# Patient Record
Sex: FEMALE | Race: WHITE
[De-identification: names, ages, dates, MRNs, and addresses within clinical notes are randomized per-mention and may not be internally consistent; named-entity substitution may affect disease eponyms.]

---

## 2018-01-06 ENCOUNTER — HOSPITAL ENCOUNTER (EMERGENCY)
Dept: HOSPITAL 8 - ED | Age: 48
Discharge: HOME | End: 2018-01-06
Payer: COMMERCIAL

## 2018-01-06 VITALS — WEIGHT: 282.41 LBS | HEIGHT: 70 IN | BODY MASS INDEX: 40.43 KG/M2

## 2018-01-06 VITALS — SYSTOLIC BLOOD PRESSURE: 123 MMHG | DIASTOLIC BLOOD PRESSURE: 65 MMHG

## 2018-01-06 DIAGNOSIS — J45.909: ICD-10-CM

## 2018-01-06 DIAGNOSIS — Z90.49: ICD-10-CM

## 2018-01-06 DIAGNOSIS — B34.9: Primary | ICD-10-CM

## 2018-01-06 LAB
ALBUMIN SERPL-MCNC: 3.3 G/DL (ref 3.4–5)
ANION GAP SERPL CALC-SCNC: 8 MMOL/L (ref 5–15)
BASOPHILS # BLD AUTO: 0.03 X10^3/UL (ref 0–0.1)
BASOPHILS NFR BLD AUTO: 1 % (ref 0–1)
CALCIUM SERPL-MCNC: 8.4 MG/DL (ref 8.5–10.1)
CHLORIDE SERPL-SCNC: 104 MMOL/L (ref 98–107)
CREAT SERPL-MCNC: 0.76 MG/DL (ref 0.55–1.02)
CULTURE INDICATED?: NO
EOSINOPHIL # BLD AUTO: 0.06 X10^3/UL (ref 0–0.4)
EOSINOPHIL NFR BLD AUTO: 1 % (ref 1–7)
ERYTHROCYTE [DISTWIDTH] IN BLOOD BY AUTOMATED COUNT: 14.9 % (ref 9.6–15.2)
LYMPHOCYTES # BLD AUTO: 0.91 X10^3/UL (ref 1–3.4)
LYMPHOCYTES NFR BLD AUTO: 18 % (ref 22–44)
MCH RBC QN AUTO: 30.1 PG (ref 27–34.8)
MCHC RBC AUTO-ENTMCNC: 32.9 G/DL (ref 32.4–35.8)
MCV RBC AUTO: 91.4 FL (ref 80–100)
MD: NO
MICROSCOPIC: (no result)
MONOCYTES # BLD AUTO: 0.48 X10^3/UL (ref 0.2–0.8)
MONOCYTES NFR BLD AUTO: 10 % (ref 2–9)
NEUTROPHILS # BLD AUTO: 3.56 X10^3/UL (ref 1.8–6.8)
NEUTROPHILS NFR BLD AUTO: 71 % (ref 42–75)
PLATELET # BLD AUTO: 268 X10^3/UL (ref 130–400)
PMV BLD AUTO: 8.1 FL (ref 7.4–10.4)
RBC # BLD AUTO: 4.24 X10^6/UL (ref 3.82–5.3)

## 2018-01-06 PROCEDURE — 99285 EMERGENCY DEPT VISIT HI MDM: CPT

## 2018-01-06 PROCEDURE — 74176 CT ABD & PELVIS W/O CONTRAST: CPT

## 2018-01-06 PROCEDURE — 71045 X-RAY EXAM CHEST 1 VIEW: CPT

## 2018-01-06 PROCEDURE — 96361 HYDRATE IV INFUSION ADD-ON: CPT

## 2018-01-06 PROCEDURE — 94640 AIRWAY INHALATION TREATMENT: CPT

## 2018-01-06 PROCEDURE — 87400 INFLUENZA A/B EACH AG IA: CPT

## 2018-01-06 PROCEDURE — 96375 TX/PRO/DX INJ NEW DRUG ADDON: CPT

## 2018-01-06 PROCEDURE — 80048 BASIC METABOLIC PNL TOTAL CA: CPT

## 2018-01-06 PROCEDURE — 96374 THER/PROPH/DIAG INJ IV PUSH: CPT

## 2018-01-06 PROCEDURE — 36415 COLL VENOUS BLD VENIPUNCTURE: CPT

## 2018-01-06 PROCEDURE — 93005 ELECTROCARDIOGRAM TRACING: CPT

## 2018-01-06 PROCEDURE — 82040 ASSAY OF SERUM ALBUMIN: CPT

## 2018-01-06 PROCEDURE — 81003 URINALYSIS AUTO W/O SCOPE: CPT

## 2018-01-06 PROCEDURE — 85025 COMPLETE CBC W/AUTO DIFF WBC: CPT

## 2019-01-07 ENCOUNTER — APPOINTMENT (OUTPATIENT)
Dept: RADIOLOGY | Facility: MEDICAL CENTER | Age: 49
End: 2019-01-07
Attending: EMERGENCY MEDICINE
Payer: COMMERCIAL

## 2019-01-07 ENCOUNTER — HOSPITAL ENCOUNTER (EMERGENCY)
Facility: MEDICAL CENTER | Age: 49
End: 2019-01-07
Attending: EMERGENCY MEDICINE
Payer: COMMERCIAL

## 2019-01-07 VITALS
TEMPERATURE: 97.7 F | WEIGHT: 280.87 LBS | HEIGHT: 71 IN | HEART RATE: 82 BPM | BODY MASS INDEX: 39.32 KG/M2 | OXYGEN SATURATION: 96 % | SYSTOLIC BLOOD PRESSURE: 127 MMHG | DIASTOLIC BLOOD PRESSURE: 58 MMHG | RESPIRATION RATE: 18 BRPM

## 2019-01-07 DIAGNOSIS — R07.89 CHEST WALL PAIN: ICD-10-CM

## 2019-01-07 LAB — EKG IMPRESSION: NORMAL

## 2019-01-07 PROCEDURE — A9270 NON-COVERED ITEM OR SERVICE: HCPCS | Performed by: EMERGENCY MEDICINE

## 2019-01-07 PROCEDURE — 93005 ELECTROCARDIOGRAM TRACING: CPT | Performed by: EMERGENCY MEDICINE

## 2019-01-07 PROCEDURE — 700102 HCHG RX REV CODE 250 W/ 637 OVERRIDE(OP): Performed by: EMERGENCY MEDICINE

## 2019-01-07 PROCEDURE — 99284 EMERGENCY DEPT VISIT MOD MDM: CPT

## 2019-01-07 PROCEDURE — 71046 X-RAY EXAM CHEST 2 VIEWS: CPT

## 2019-01-07 PROCEDURE — 93005 ELECTROCARDIOGRAM TRACING: CPT

## 2019-01-07 RX ORDER — HYDROCODONE BITARTRATE AND ACETAMINOPHEN 5; 325 MG/1; MG/1
1 TABLET ORAL ONCE
Status: COMPLETED | OUTPATIENT
Start: 2019-01-07 | End: 2019-01-07

## 2019-01-07 RX ADMIN — HYDROCODONE BITARTRATE AND ACETAMINOPHEN 1 TABLET: 5; 325 TABLET ORAL at 17:14

## 2019-01-07 ASSESSMENT — PAIN SCALES - GENERAL: PAINLEVEL_OUTOF10: 9

## 2019-01-07 NOTE — ED TRIAGE NOTES
"Pt to triage .  Chief Complaint   Patient presents with   • Chest Wall Pain     chest wall pain post \"asthma attack\" last night difficulty taking deep breath r/t pain increases with inspiration and movement      "

## 2019-01-07 NOTE — ED PROVIDER NOTES
"ED Provider Note    Scribed for Dr. Ketan Connell M.D. by Fiona Verduzco. 1/7/2019  3:00 PM  Scribed for Ketan Connell M.D. by Kellie Veliz 1/7/2019  4:18 PM    Primary care provider: No primary care provider on file.  Means of arrival: walk-in  History obtained from: patient,   History limited by: none    CHIEF COMPLAINT  Chief Complaint   Patient presents with   • Chest Wall Pain     chest wall pain post \"asthma attack\" last night difficulty taking deep breath r/t pain increases with inspiration and movement        HPI  Chary Vazquez is a 48 y.o. female who presents to the Emergency Department with a history of asthma for sudden chest wall pain onset last night. She states that last night she had a coughing attack and has since developed left chest wall pain. The pain radiates into her left upper back and is worsened with movement and inhalation. The chest pains have been constant since then, which is why she came to the ED. She denies any shortness of breath at this time and reports that the cough has resolved.        REVIEW OF SYSTEMS  Pertinent positives include resolved cough, left chest wall pain, left upper back pain. Pertinent negatives include no shortness of breath. All other systems have been reviewed and are negative. C.    See HPI for further details.     PAST MEDICAL HISTORY   has a past medical history of Asthma.    SURGICAL HISTORY  patient denies any surgical history    SOCIAL HISTORY  Social History   Substance Use Topics   • Smoking status: Never Smoker   • Smokeless tobacco: Never Used   • Alcohol use No      History   Drug Use No       FAMILY HISTORY  History reviewed. No pertinent family history.    CURRENT MEDICATIONS  Home Medications     Reviewed by Sylvie Hung R.N. (Registered Nurse) on 01/07/19 at 1353  Med List Status: Partial   Medication Last Dose Status        Patient Alexander Taking any Medications                       ALLERGIES  Allergies   Allergen Reactions   • Pcn " "[Penicillins]      Rash/throat swelling       PHYSICAL EXAM  VITAL SIGNS: /96   Pulse (!) 120   Temp 37.2 °C (98.9 °F) (Temporal)   Resp 18   Ht 1.803 m (5' 11\")   Wt (!) 127.4 kg (280 lb 13.9 oz)   LMP 2018   SpO2 99%   BMI 39.17 kg/m²     Constitutional: Well developed, Well nourished, No distress, Non-toxic appearance.   HENT: Normocephalic, Atraumatic, Bilateral external ears normal, Oropharynx moist, No oral exudates.   Eyes: PERRLA, EOMI, Conjunctiva normal, No discharge.   Neck: No tenderness, Supple, No stridor.   Lymphatic: No lymphadenopathy noted.   Cardiovascular: Tachycardic, Normal rhythm.   Thorax & Lungs: Clear to auscultation bilaterally, No respiratory distress, No wheezing, No crackles. Left anterior chest wall tenderness  Back: Left upper back tenderness.  Abdomen: Soft, No tenderness, No masses, No pulsatile masses.   Skin: Warm, Dry, No erythema, No rash.   Extremities:, No edema No cyanosis.   Musculoskeletal: No tenderness to palpation or major deformities noted.  Intact distal pulses  Neurologic: Awake, alert. Moves all extremities spontaneously.  Psychiatric: Affect normal, Judgment normal, Mood normal.     LABS  Results for orders placed or performed during the hospital encounter of 19   EKG   Result Value Ref Range    Report       [facility] Emergency Dept.    Test Date:  2019  Pt Name:    MELINA WHITNEY                Department: ER  MRN:        [id number]                      Room:  Gender:     Female                       Technician: [id number]  :        1970                   Requested By:ER TRIAGE PROTOCOL  Order #:    [id number]                    Reading MD: BELIA BRAXTON MD    Measurements  Intervals                                Axis  Rate:       92                           P:          37  GA:         144                          QRS:        35  QRSD:       94                           T:          58  QT:         368  QTc:    "     456    Interpretive Statements  SINUS RHYTHM  BASELINE WANDER IN LEAD(S) II,III,aVF,V4,V5,V6  No previous ECG available for comparison    Electronically Signed On 1-7-2019 16:25:43 PST by BELIA BRAXTON MD         All labs reviewed by me.    EKG  Interpreted by me as shown above.    RADIOLOGY  DX-CHEST-2 VIEWS   Final Result      Probable vessel on in versus pulmonary nodule in the left perihilar region. Consider nonemergent follow up CT for further evaluation.      No evidence of pulmonary consolidation.        The radiologist's interpretation of all radiological studies have been reviewed by me.    COURSE & MEDICAL DECISION MAKING  Pertinent Labs & Imaging studies reviewed. (See chart for details)    3:00 PM - Patient seen and examined at bedside. I ordered a chest x-ray to evaluate her symptoms and discussed plan of imaging with patient who was agreeable. I also ordered an EKG to rule-out cardiac involvement.    4:23 PM - Patient was reevaluated at bedside. Discussed EKG and radiology results with the patient and informed them of the results shown above and should follow up with her PCP. The patient will be discharged and should return if symptoms worsen or if new symptoms arise. The patient understands and agrees to plan.       The differential diagnoses include but are not limited to: Chest wall pain, pneumonia    Decision Making:  Patient presenting with exam certainly consistent with chest wall pain she is no longer having difficulty breathing or wheezing.  X-ray is negative I think this represents chest wall pain.    The patient will return for new or worsening symptoms and is stable at the time of discharge.    The patient is referred to a primary physician for blood pressure management, diabetic screening, and for all other preventative health concerns.    DISPOSITION:  Patient will be discharged home in stable condition.    FOLLOW UP:  [facility], Emergency Dept  [address]  [facility] [address] [phone]    As needed    FINAL IMPRESSION  1. Chest wall pain          I, Fiona Verduzco (Scribe), am scribing for, and in the presence of, Ketan Connell M.D..    Electronically signed by: Fiona Verduzco (Marcelinoibnayana), 1/7/2019    IKetan M.D. personally performed the services described in this documentation, as scribed by Fiona Verduzco in my presence, and it is both accurate and complete.    The note accurately reflects work and decisions made by me.  Ketan Connell  1/7/2019  4:34 PM

## 2019-02-21 ENCOUNTER — OFFICE VISIT (OUTPATIENT)
Dept: URGENT CARE | Facility: CLINIC | Age: 49
End: 2019-02-21
Payer: COMMERCIAL

## 2019-02-21 VITALS
RESPIRATION RATE: 14 BRPM | DIASTOLIC BLOOD PRESSURE: 90 MMHG | HEART RATE: 86 BPM | TEMPERATURE: 98.2 F | OXYGEN SATURATION: 95 % | WEIGHT: 284 LBS | SYSTOLIC BLOOD PRESSURE: 128 MMHG | BODY MASS INDEX: 39.76 KG/M2 | HEIGHT: 71 IN

## 2019-02-21 DIAGNOSIS — L08.9 SKIN INFECTION: ICD-10-CM

## 2019-02-21 PROCEDURE — 99214 OFFICE O/P EST MOD 30 MIN: CPT | Performed by: FAMILY MEDICINE

## 2019-02-21 RX ORDER — SULFAMETHOXAZOLE AND TRIMETHOPRIM 800; 160 MG/1; MG/1
1 TABLET ORAL EVERY 12 HOURS
Qty: 14 TAB | Refills: 0 | Status: SHIPPED | OUTPATIENT
Start: 2019-02-21 | End: 2019-02-28

## 2019-02-21 ASSESSMENT — ENCOUNTER SYMPTOMS
FOCAL WEAKNESS: 0
FEVER: 0
HEMOPTYSIS: 0
DIZZINESS: 0
CHILLS: 0
ORTHOPNEA: 0
SHORTNESS OF BREATH: 0

## 2019-02-21 NOTE — LETTER
February 21, 2019         Patient: Chary Vazquez   YOB: 1970   Date of Visit: 2/21/2019           To Whom it May Concern:    Chary Vazquez was seen in my clinic on 2/21/2019. May use open toe shoes x 1 week.    If you have any questions or concerns, please don't hesitate to call.        Sincerely,           Joaquin Hayden M.D.  Electronically Signed

## 2019-02-22 NOTE — PROGRESS NOTES
"Subjective:      Chary Vazquez is a 49 y.o. female who presents with Toe Pain (left foot third toe infection x 3 days)      - This is a very pleasant, well and non-toxic appearing 49 y.o. female with complaints of 2 days w/ pain swelling Lt middle toe. No NVFC          ALLERGIES:  Pcn [penicillins]     PMH:  Past Medical History:   Diagnosis Date   • Asthma         PSH:  No past surgical history on file.    MEDS:    Current Outpatient Prescriptions:   •  sulfamethoxazole-trimethoprim (BACTRIM DS) 800-160 MG tablet, Take 1 Tab by mouth every 12 hours for 7 days., Disp: 14 Tab, Rfl: 0  •  ALBUTEROL INH, Inhale  by mouth., Disp: , Rfl:     ** I have documented what I find to be significant in regards to past medical, social, family and surgical history  in my HPI or under PMH/PSH/FH review section, otherwise it is contributory **         HPI    Review of Systems   Constitutional: Negative for chills and fever.   Respiratory: Negative for hemoptysis and shortness of breath.    Cardiovascular: Negative for chest pain and orthopnea.   Skin:        Wound/infection Lt middle toe    Neurological: Negative for dizziness and focal weakness.          Objective:     /90   Pulse 86   Temp 36.8 °C (98.2 °F)   Resp 14   Ht 1.803 m (5' 11\")   Wt (!) 128.8 kg (284 lb)   SpO2 95%   BMI 39.61 kg/m²      Physical Exam   Constitutional: She appears well-developed. No distress.   HENT:   Head: Normocephalic and atraumatic.   Cardiovascular: Regular rhythm.    No murmur heard.  Pulmonary/Chest: Effort normal. No respiratory distress.   Neurological: She is alert. She exhibits normal muscle tone.   Skin: Skin is warm and dry.   Psychiatric: She has a normal mood and affect. Judgment normal.   Nursing note and vitals reviewed.  Lt middle toe: + medial side toe w/ erythema and TTP w/ a little edema             Assessment/Plan:         1. Skin infection  sulfamethoxazole-trimethoprim (BACTRIM DS) 800-160 MG tablet       - cont " warm epsom soaks  - rest/elevate   - 2 day recheck       Dx & d/c instructions discussed w/ patient and/or family members.     ER precautions (worsening signs symptoms and when to go to ER) discussed.    Follow up w/ PCP in 2-3 days to make sure symptoms improving and no further intervention/treatment and/or work-up needed was advised, ER if feeling worse or not improving in 2 days.    Possible side effects (i.e. Rash, GI upset/constipation, sedation, elevation of BP or sugars) of any medications given discussed.     Patient left in stable condition

## 2019-05-10 ENCOUNTER — HOSPITAL ENCOUNTER (OUTPATIENT)
Dept: RADIOLOGY | Facility: MEDICAL CENTER | Age: 49
End: 2019-05-10
Attending: COLON & RECTAL SURGERY
Payer: COMMERCIAL

## 2019-05-10 DIAGNOSIS — R10.814 ABDOMINAL TENDERNESS OF LEFT LOWER QUADRANT, REBOUND TENDERNESS PRESENCE NOT SPECIFIED: ICD-10-CM

## 2019-05-10 DIAGNOSIS — R10.32 ABDOMINAL PAIN, LEFT LOWER QUADRANT: ICD-10-CM

## 2019-05-10 PROCEDURE — 700117 HCHG RX CONTRAST REV CODE 255: Performed by: COLON & RECTAL SURGERY

## 2019-05-10 PROCEDURE — 74177 CT ABD & PELVIS W/CONTRAST: CPT

## 2019-05-10 RX ADMIN — IOHEXOL 25 ML: 240 INJECTION, SOLUTION INTRATHECAL; INTRAVASCULAR; INTRAVENOUS; ORAL at 13:30

## 2019-05-10 RX ADMIN — IOHEXOL 100 ML: 350 INJECTION, SOLUTION INTRAVENOUS at 13:30

## 2019-06-10 ENCOUNTER — HOSPITAL ENCOUNTER (EMERGENCY)
Facility: MEDICAL CENTER | Age: 49
End: 2019-06-10
Attending: EMERGENCY MEDICINE
Payer: COMMERCIAL

## 2019-06-10 ENCOUNTER — APPOINTMENT (OUTPATIENT)
Dept: RADIOLOGY | Facility: MEDICAL CENTER | Age: 49
End: 2019-06-10
Attending: EMERGENCY MEDICINE
Payer: COMMERCIAL

## 2019-06-10 VITALS
RESPIRATION RATE: 18 BRPM | HEIGHT: 71 IN | HEART RATE: 67 BPM | BODY MASS INDEX: 38.5 KG/M2 | SYSTOLIC BLOOD PRESSURE: 123 MMHG | DIASTOLIC BLOOD PRESSURE: 70 MMHG | WEIGHT: 275 LBS | OXYGEN SATURATION: 95 % | TEMPERATURE: 97.9 F

## 2019-06-10 DIAGNOSIS — M25.561 CHRONIC PAIN OF RIGHT KNEE: ICD-10-CM

## 2019-06-10 DIAGNOSIS — G89.29 CHRONIC PAIN OF RIGHT KNEE: ICD-10-CM

## 2019-06-10 PROCEDURE — 99284 EMERGENCY DEPT VISIT MOD MDM: CPT

## 2019-06-10 PROCEDURE — 93971 EXTREMITY STUDY: CPT | Mod: 26,RT | Performed by: INTERNAL MEDICINE

## 2019-06-10 PROCEDURE — 96372 THER/PROPH/DIAG INJ SC/IM: CPT

## 2019-06-10 PROCEDURE — 700111 HCHG RX REV CODE 636 W/ 250 OVERRIDE (IP): Performed by: EMERGENCY MEDICINE

## 2019-06-10 PROCEDURE — 93971 EXTREMITY STUDY: CPT | Mod: RT

## 2019-06-10 RX ORDER — KETOROLAC TROMETHAMINE 30 MG/ML
60 INJECTION, SOLUTION INTRAMUSCULAR; INTRAVENOUS ONCE
Status: COMPLETED | OUTPATIENT
Start: 2019-06-10 | End: 2019-06-10

## 2019-06-10 RX ORDER — NAPROXEN 500 MG/1
500 TABLET ORAL 2 TIMES DAILY WITH MEALS
Qty: 60 TAB | Refills: 0 | Status: SHIPPED | OUTPATIENT
Start: 2019-06-10 | End: 2019-11-13

## 2019-06-10 RX ADMIN — KETOROLAC TROMETHAMINE 60 MG: 30 INJECTION, SOLUTION INTRAMUSCULAR at 18:22

## 2019-06-10 NOTE — ED TRIAGE NOTES
Patient sent by O for R knee pain for 2 days and DVT rule out. She denies any SOB or CP. No swelling or redness noted.

## 2019-06-11 NOTE — ED NOTES
Discharge instructions provided.  Pt verbalized the understanding of discharge instructions to follow up with PCP and to return to ER if condition worsens.  Pt ambulated out of ER without difficulty. RX 0

## 2019-06-11 NOTE — ED PROVIDER NOTES
"ED Provider Note    CHIEF COMPLAINT  Chief Complaint   Patient presents with   • Knee Pain       HPI  Chary Vazquez is a 49 y.o. female who presents with chronic right knee pain that is gotten worse recently.  She saw Dr. Trent at [facility] orthopedics who instructed her to come to the emergency department for possible DVT.  She is been feeling like her right calf is swollen and hard.  She denies any chest pain or shortness of breath.  She denies any fevers or vomiting.  She took ibuprofen early today and states the pain is severe now because it has worn off.    REVIEW OF SYSTEMS  See HPI for further details.  No chest pain, shortness of breath.  All other systems are negative    PAST MEDICAL HISTORY  Past Medical History:   Diagnosis Date   • Asthma        FAMILY HISTORY  No family history on file.    SOCIAL HISTORY  Social History     Social History   • Marital status:      Spouse name: N/A   • Number of children: N/A   • Years of education: N/A     Social History Main Topics   • Smoking status: Never Smoker   • Smokeless tobacco: Never Used   • Alcohol use No   • Drug use: No   • Sexual activity: Not on file     Other Topics Concern   • Not on file     Social History Narrative   • No narrative on file       SURGICAL HISTORY  No past surgical history on file.    CURRENT MEDICATIONS    Current Facility-Administered Medications:   •  ketorolac (TORADOL) injection 60 mg, 60 mg, Intramuscular, Once, Tamika Plasencia M.D.    Current Outpatient Prescriptions:   •  ALBUTEROL INH, Inhale  by mouth., Disp: , Rfl:       ALLERGIES  Allergies   Allergen Reactions   • Pcn [Penicillins]      Rash/throat swelling       PHYSICAL EXAM  VITAL SIGNS: /82   Pulse 85   Resp 18   Ht 1.803 m (5' 11\")   Wt 124.7 kg (275 lb)   LMP 04/11/2019   SpO2 98%   BMI 38.35 kg/m²  @MITRA[[id number]::@   Constitutional: Well developed, morbidly obese, No acute respiratory distress, Non-toxic appearance.   HENT: Normocephalic, " Atraumatic, Bilateral external ears normal, Oropharynx clear, mucous membranes are moist.  Eyes: Conjunctiva normal, No discharge. No icterus.  Neck: Normal range of motion. Supple.  Skin: Warm, Dry, No erythema, No rash.  Well-healed incision anterior right knee  Musculoskeletal: Generalized tenderness to the right calf, popliteal fossa, distal quad and hamstrings.  Patient unable to go through full range of motion because she just had exam with Dr. Trent according to her.  2+ right dorsalis pedis pulse.  Negative Homans sign  Neurologic: Alert & oriented x 3. No focal deficits noted.  Sensation intact right foot    US-EXTREMITY VENOUS LOWER UNILAT RIGHT   Final Result            COURSE & MEDICAL DECISION MAKING  Pertinent Labs & Imaging studies reviewed. (See chart for details)  Patient is given ibuprofen and Tylenol for pain.  She is negative for DVT.  She is to follow-up with her orthopedist for further evaluation.  The patient will return for new or worsening symptoms and is stable at the time of discharge.    The patient is referred to a primary physician for blood pressure management, diabetic screening, and for all other preventative health concerns.      DISPOSITION:  Patient will be discharged home in stable condition.    FOLLOW UP:  Ketan Armstrong M.D.  [address]  [address]  [facility] [address]  [phone]    Schedule an appointment as soon as possible for a visit         OUTPATIENT MEDICATIONS:  New Prescriptions    No medications on file        FINAL IMPRESSION  1. Chronic pain of right knee               Electronically signed by: Tamika Plasencia, 6/10/2019 5:40 PM

## 2019-06-21 ENCOUNTER — HOSPITAL ENCOUNTER (EMERGENCY)
Dept: HOSPITAL 8 - ED | Age: 49
LOS: 1 days | Discharge: HOME | End: 2019-06-22
Payer: COMMERCIAL

## 2019-06-21 VITALS — DIASTOLIC BLOOD PRESSURE: 79 MMHG | SYSTOLIC BLOOD PRESSURE: 135 MMHG

## 2019-06-21 VITALS — BODY MASS INDEX: 38.7 KG/M2 | HEIGHT: 70.5 IN | WEIGHT: 273.37 LBS
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
## 2019-06-21 DIAGNOSIS — M19.90: ICD-10-CM
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
## 2019-06-21 DIAGNOSIS — M25.561: ICD-10-CM

## 2019-06-21 DIAGNOSIS — S83.421A: ICD-10-CM

## 2019-06-21 DIAGNOSIS — S83.411A: Primary | ICD-10-CM

## 2019-06-21 DIAGNOSIS — G89.29: ICD-10-CM
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
## 2019-06-21 PROCEDURE — 99284 EMERGENCY DEPT VISIT MOD MDM: CPT

## 2019-06-21 PROCEDURE — 93971 EXTREMITY STUDY: CPT

## 2019-06-21 PROCEDURE — 96372 THER/PROPH/DIAG INJ SC/IM: CPT

## 2019-09-03 ENCOUNTER — OFFICE VISIT (OUTPATIENT)
Dept: URGENT CARE | Facility: CLINIC | Age: 49
End: 2019-09-03
Payer: COMMERCIAL

## 2019-09-03 VITALS
SYSTOLIC BLOOD PRESSURE: 140 MMHG | HEART RATE: 100 BPM | OXYGEN SATURATION: 94 % | WEIGHT: 274 LBS | DIASTOLIC BLOOD PRESSURE: 80 MMHG | HEIGHT: 71 IN | TEMPERATURE: 98.7 F | RESPIRATION RATE: 18 BRPM | BODY MASS INDEX: 38.36 KG/M2

## 2019-09-03 DIAGNOSIS — J06.9 URI WITH COUGH AND CONGESTION: ICD-10-CM

## 2019-09-03 DIAGNOSIS — J40 BRONCHITIS: Primary | ICD-10-CM

## 2019-09-03 DIAGNOSIS — J45.41 MODERATE PERSISTENT ASTHMA WITH EXACERBATION: ICD-10-CM

## 2019-09-03 PROCEDURE — 99214 OFFICE O/P EST MOD 30 MIN: CPT | Performed by: PHYSICIAN ASSISTANT

## 2019-09-03 RX ORDER — DEXTROMETHORPHAN HYDROBROMIDE AND PROMETHAZINE HYDROCHLORIDE 15; 6.25 MG/5ML; MG/5ML
5 SYRUP ORAL EVERY 4 HOURS PRN
Qty: 120 ML | Refills: 0 | Status: SHIPPED | OUTPATIENT
Start: 2019-09-03 | End: 2020-03-11

## 2019-09-03 RX ORDER — DOXYCYCLINE HYCLATE 100 MG
100 TABLET ORAL 2 TIMES DAILY
Qty: 14 TAB | Refills: 0 | Status: SHIPPED | OUTPATIENT
Start: 2019-09-03 | End: 2019-09-10

## 2019-09-03 RX ORDER — PREDNISONE 20 MG/1
TABLET ORAL
Qty: 23 TAB | Refills: 0 | Status: SHIPPED | OUTPATIENT
Start: 2019-09-03 | End: 2020-03-11

## 2019-09-03 NOTE — PROGRESS NOTES
Subjective:      Pt is a 49 y.o. female who presents with Wheezing (Over a week dry cough , chest congestion, wheezing)            HPI  This is a new problem. PT presents to  clinic today complaining of sore throat, fevers, pressure in ears, cough, fatigue, runny nose, wheezing and SOB with asthma exacerbation. PT denies CP, NVD, abdominal pain, joint pain. PT states these symptoms began around 3 days ago. PT states the pain is a 7/10 with coughing fits, aching in nature and worse at night. Pt has not taken any RX medications for this condition. The pt's medication list, problem list, and allergies have been evaluated and reviewed during today's visit.    PMH:  Past Medical History:   Diagnosis Date   • Asthma        PSH:  Negative per pt.      Fam Hx:  the patient's family history is not pertinent to their current complaint    Soc HX:  Social History     Socioeconomic History   • Marital status:      Spouse name: Not on file   • Number of children: Not on file   • Years of education: Not on file   • Highest education level: Not on file   Occupational History   • Not on file   Social Needs   • Financial resource strain: Not on file   • Food insecurity:     Worry: Not on file     Inability: Not on file   • Transportation needs:     Medical: Not on file     Non-medical: Not on file   Tobacco Use   • Smoking status: Never Smoker   • Smokeless tobacco: Never Used   Substance and Sexual Activity   • Alcohol use: No   • Drug use: No   • Sexual activity: Not on file   Lifestyle   • Physical activity:     Days per week: Not on file     Minutes per session: Not on file   • Stress: Not on file   Relationships   • Social connections:     Talks on phone: Not on file     Gets together: Not on file     Attends Mu-ism service: Not on file     Active member of club or organization: Not on file     Attends meetings of clubs or organizations: Not on file     Relationship status: Not on file   • Intimate partner violence:      Fear of current or ex partner: Not on file     Emotionally abused: Not on file     Physically abused: Not on file     Forced sexual activity: Not on file   Other Topics Concern   • Not on file   Social History Narrative   • Not on file         Medications:    Current Outpatient Medications:   •  Albuterol Sulfate (PROAIR RESPICLICK) 108 (90 Base) MCG/ACT AEROSOL POWDER, BREATH ACTIVATED, Inhale 2 Puffs by mouth every 4 hours., Disp: 1 Each, Rfl: 3  •  predniSONE (DELTASONE) 20 MG Tab, Take 3 tabs at once PO daily x 5 days, then take 2 tabs at once daily x 3 days, then take 1 tab PO daily x 2 days, Disp: 23 Tab, Rfl: 0  •  doxycycline (VIBRAMYCIN) 100 MG Tab, Take 1 Tab by mouth 2 times a day for 7 days., Disp: 14 Tab, Rfl: 0  •  promethazine-dextromethorphan (PROMETHAZINE-DM) 6.25-15 MG/5ML syrup, Take 5 mL by mouth every four hours as needed., Disp: 120 mL, Rfl: 0  •  naproxen (NAPROSYN) 500 MG Tab, Take 1 Tab by mouth 2 times a day, with meals., Disp: 60 Tab, Rfl: 0      Allergies:  Pcn [penicillins]    ROS  Review of Systems   Constitutional: Positive for malaise/fatigue. Negative for fever and diaphoresis.   HENT: Positive for congestion and sore throat. Negative for ear discharge, hearing loss, nosebleeds and tinnitus.    Eyes: Negative for blurred vision, double vision and photophobia.   Respiratory: Positive for cough, sputum production, shortness of breath and wheezing. Negative for hemoptysis.    Cardiovascular: Negative for chest pain and palpitations.   Gastrointestinal: Negative for nausea, vomiting, abdominal pain, diarrhea and constipation.   Genitourinary: Negative for dysuria and flank pain.   Musculoskeletal: Negative for joint pain and myalgias.   Skin: Negative for itching and rash.   Neurological:  Negative for dizziness, tingling and weakness.   Endo/Heme/Allergies: Does not bruise/bleed easily.   Psychiatric/Behavioral: Negative for depression. The patient is not nervous/anxious.   "           Objective:     /80   Pulse 100   Temp 37.1 °C (98.7 °F) (Temporal)   Resp 18   Ht 1.803 m (5' 11\")   Wt 124.3 kg (274 lb)   SpO2 94%   BMI 38.22 kg/m²      Physical Exam       Physical Exam   Constitutional: PT is oriented to person, place, and time. PT appears well-developed and well-nourished. No distress.   HENT:   Head: Normocephalic and atraumatic.   Right Ear: Hearing, tympanic membrane, external ear and ear canal normal.   Left Ear: Hearing, tympanic membrane, external ear and ear canal normal.   Nose: Mucosal edema, rhinorrhea and sinus tenderness present. Right sinus exhibits frontal sinus tenderness. Left sinus exhibits frontal sinus tenderness.   Mouth/Throat: Uvula is midline. Mucous membranes are pale. Posterior oropharyngeal edema and posterior oropharyngeal erythema present. No oropharyngeal exudate.   Eyes: Conjunctivae normal and EOM are normal. Pupils are equal, round, and reactive to light. Right eye exhibits no discharge. Left eye exhibits no discharge.   Neck: Normal range of motion. Neck supple. No thyromegaly present.   Cardiovascular: Normal rate, regular rhythm, normal heart sounds and intact distal pulses.  Exam reveals no gallop and no friction rub.    No murmur heard.  Pulmonary/Chest: Effort normal. No respiratory distress. PT has wheezes. PT has no rales. PT exhibits tenderness.   Abdominal: Soft. Bowel sounds are normal. PT exhibits no distension and no mass. There is no tenderness. There is no rebound and no guarding.   Musculoskeletal: Normal range of motion. PT exhibits no edema and no tenderness.   Lymphadenopathy:     PT has no cervical adenopathy.   Neurological: Pt is alert and oriented to person, place, and time. Pt has normal reflexes. No cranial nerve deficit.   Skin: Skin is warm and dry. No rash noted. No erythema.   Psychiatric: PT has a normal mood and affect. Pt behavior is normal. Judgment and thought content normal.        Assessment/Plan: "     1. Bronchitis    - Albuterol Sulfate (PROAIR RESPICLICK) 108 (90 Base) MCG/ACT AEROSOL POWDER, BREATH ACTIVATED; Inhale 2 Puffs by mouth every 4 hours.  Dispense: 1 Each; Refill: 3  - predniSONE (DELTASONE) 20 MG Tab; Take 3 tabs at once PO daily x 5 days, then take 2 tabs at once daily x 3 days, then take 1 tab PO daily x 2 days  Dispense: 23 Tab; Refill: 0  - doxycycline (VIBRAMYCIN) 100 MG Tab; Take 1 Tab by mouth 2 times a day for 7 days.  Dispense: 14 Tab; Refill: 0    2. URI with cough and congestion    - predniSONE (DELTASONE) 20 MG Tab; Take 3 tabs at once PO daily x 5 days, then take 2 tabs at once daily x 3 days, then take 1 tab PO daily x 2 days  Dispense: 23 Tab; Refill: 0  - promethazine-dextromethorphan (PROMETHAZINE-DM) 6.25-15 MG/5ML syrup; Take 5 mL by mouth every four hours as needed.  Dispense: 120 mL; Refill: 0    3. Moderate persistent asthma with exacerbation    - Albuterol Sulfate (PROAIR RESPICLICK) 108 (90 Base) MCG/ACT AEROSOL POWDER, BREATH ACTIVATED; Inhale 2 Puffs by mouth every 4 hours.  Dispense: 1 Each; Refill: 3  - predniSONE (DELTASONE) 20 MG Tab; Take 3 tabs at once PO daily x 5 days, then take 2 tabs at once daily x 3 days, then take 1 tab PO daily x 2 days  Dispense: 23 Tab; Refill: 0    Rest, fluids encouraged.  OTC decongestant for congestion/cough  Note given for work.  AVS with medical info given.  Pt was in full understanding and agreement with the plan.  Differential diagnosis, natural history, supportive care, and indications for immediate follow-up discussed. All questions answered. Patient agrees with the plan of care.  Follow-up as needed if symptoms worsen or fail to improve.

## 2019-09-03 NOTE — LETTER
September 3, 2019       Patient: Chary Vazquez   YOB: 1970   Date of Visit: 9/3/2019         To Whom It May Concern:    It is my medical opinion that Chary Vazquez may be excused from work for the dates of 9/3/19-9/6/19.      If you have any questions or concerns, please don't hesitate to call [phone]          Sincerely,          Francois Lazaro P.A.-C.  Electronically Signed

## 2019-09-03 NOTE — PATIENT INSTRUCTIONS
Acute Bronchitis, Adult  Acute bronchitis is when air tubes (bronchi) in the lungs suddenly get swollen. The condition can make it hard to breathe. It can also cause these symptoms:  · A cough.  · Coughing up clear, yellow, or green mucus.  · Wheezing.  · Chest congestion.  · Shortness of breath.  · A fever.  · Body aches.  · Chills.  · A sore throat.  Follow these instructions at home:  Medicines  · Take over-the-counter and prescription medicines only as told by your doctor.  · If you were prescribed an antibiotic medicine, take it as told by your doctor. Do not stop taking the antibiotic even if you start to feel better.  General instructions  · Rest.  · Drink enough fluids to keep your pee (urine) clear or pale yellow.  · Avoid smoking and secondhand smoke. If you smoke and you need help quitting, ask your doctor. Quitting will help your lungs heal faster.  · Use an inhaler, cool mist vaporizer, or humidifier as told by your doctor.  · Keep all follow-up visits as told by your doctor. This is important.  How is this prevented?  To lower your risk of getting this condition again:  · Wash your hands often with soap and water. If you cannot use soap and water, use hand .  · Avoid contact with people who have cold symptoms.  · Try not to touch your hands to your mouth, nose, or eyes.  · Make sure to get the flu shot every year.  Contact a doctor if:  · Your symptoms do not get better in 2 weeks.  Get help right away if:  · You cough up blood.  · You have chest pain.  · You have very bad shortness of breath.  · You become dehydrated.  · You faint (pass out) or keep feeling like you are going to pass out.  · You keep throwing up (vomiting).  · You have a very bad headache.  · Your fever or chills gets worse.  This information is not intended to replace advice given to you by your health care provider. Make sure you discuss any questions you have with your health care provider.  Document Released: 06/05/2009  Document Revised: 07/26/2017 Document Reviewed: 06/07/2017  [employer] Interactive Patient Education © 2017 [employer]

## 2019-11-13 ENCOUNTER — OFFICE VISIT (OUTPATIENT)
Dept: URGENT CARE | Facility: CLINIC | Age: 49
End: 2019-11-13
Payer: COMMERCIAL

## 2019-11-13 VITALS
SYSTOLIC BLOOD PRESSURE: 132 MMHG | OXYGEN SATURATION: 96 % | WEIGHT: 279.8 LBS | HEIGHT: 71 IN | TEMPERATURE: 98.9 F | BODY MASS INDEX: 39.17 KG/M2 | DIASTOLIC BLOOD PRESSURE: 72 MMHG | HEART RATE: 106 BPM | RESPIRATION RATE: 20 BRPM

## 2019-11-13 DIAGNOSIS — J45.20 MILD INTERMITTENT ASTHMA, UNSPECIFIED WHETHER COMPLICATED: ICD-10-CM

## 2019-11-13 DIAGNOSIS — M77.11 LATERAL EPICONDYLITIS OF RIGHT ELBOW: Primary | ICD-10-CM

## 2019-11-13 PROCEDURE — 99214 OFFICE O/P EST MOD 30 MIN: CPT | Performed by: PHYSICIAN ASSISTANT

## 2019-11-13 RX ORDER — NAPROXEN 500 MG/1
500 TABLET ORAL 2 TIMES DAILY WITH MEALS
Qty: 14 TAB | Refills: 0 | Status: SHIPPED | OUTPATIENT
Start: 2019-11-13 | End: 2019-11-20

## 2019-11-13 ASSESSMENT — PATIENT HEALTH QUESTIONNAIRE - PHQ9: CLINICAL INTERPRETATION OF PHQ2 SCORE: 0

## 2019-11-14 ASSESSMENT — ENCOUNTER SYMPTOMS
ABDOMINAL PAIN: 0
DIARRHEA: 0
VOMITING: 0
NAUSEA: 0
COUGH: 0
NUMBNESS: 0
CONSTIPATION: 0
MUSCLE WEAKNESS: 0
SHORTNESS OF BREATH: 0
TINGLING: 0
FEVER: 0
CHILLS: 0

## 2019-11-14 NOTE — PATIENT INSTRUCTIONS
Tennis Elbow  Tennis elbow (lateral epicondylitis) is inflammation of the outer tendons of your forearm close to your elbow. Your tendons attach your muscles to your bones. The outer tendons of your forearm are used to extend your wrist, and they attach on the outside part of your elbow. Tennis elbow is often found in people who play tennis, but anyone may get the condition from repeatedly extending the wrist or turning the forearm.  What are the causes?  This condition is caused by repeatedly extending your wrist and using your hands. It can result from sports or work that requires repetitive forearm movements. Tennis elbow may also be caused by an injury.  What increases the risk?  You have a higher risk of developing tennis elbow if you play tennis or another racquet sport. You also have a higher risk if you frequently use your hands for work. This condition is also more likely to develop in:  · Musicians.  · , painters, and plumbers.  · Cooks.  · [place].  · People who work in factories.  · Construction workers.  · Butchers.  · People who use computers.  What are the signs or symptoms?  Symptoms of this condition include:  · Pain and tenderness in your forearm and the outer part of your elbow. You may only feel the pain when you use your arm, or you may feel it even when you are not using your arm.  · A burning feeling that runs from your elbow through your arm.  · Weak  in your hands.  How is this diagnosed?  This condition may be diagnosed by medical history and physical exam. You may also have other tests, including:  · X-rays.  · MRI.  How is this treated?  Your health care provider will recommend lifestyle adjustments, such as resting and icing your arm. Treatment may also include:  · Medicines for inflammation. This may include shots of cortisone if your pain continues.  · Physical therapy. This may include massage or exercises.  · An elbow brace.  Surgery may eventually be recommended if  your pain does not go away with treatment.  Follow these instructions at home:  Activity  · Rest your elbow and wrist as directed by your health care provider. Try to avoid any activities that caused the problem until your health care provider says that you can do them again.  · If a physical therapist teaches you exercises, do all of them as directed.  · If you lift an object, lift it with your palm facing upward. This lowers the stress on your elbow.  Lifestyle  · If your tennis elbow is caused by sports, check your equipment and make sure that:  ¨ You are using it correctly.  ¨ It is the best fit for you.  · If your tennis elbow is caused by work, take breaks frequently, if you are able. Talk with your manager about how to best perform tasks in a way that is safe.  ¨ If your tennis elbow is caused by computer use, talk with your manager about any changes that can be made to your work environment.  General instructions  · If directed, apply ice to the painful area:  ¨ Put ice in a plastic bag.  ¨ Place a towel between your skin and the bag.  ¨ Leave the ice on for 20 minutes, 2-3 times per day.  · Take medicines only as directed by your health care provider.  · If you were given a brace, wear it as directed by your health care provider.  · Keep all follow-up visits as directed by your health care provider. This is important.  Contact a health care provider if:  · Your pain does not get better with treatment.  · Your pain gets worse.  · You have numbness or weakness in your forearm, hand, or fingers.  This information is not intended to replace advice given to you by your health care provider. Make sure you discuss any questions you have with your health care provider.  Document Released: 12/18/2006 Document Revised: 08/17/2017 Document Reviewed: 12/14/2015  [employer] Interactive Patient Education © 2017 [employer]        Tennis Elbow Rehab  Ask your health care provider which exercises are safe for you. Do  exercises exactly as told by your health care provider and adjust them as directed. It is normal to feel mild stretching, pulling, tightness, or discomfort as you do these exercises, but you should stop right away if you feel sudden pain or your pain gets worse. Do not begin these exercises until told by your health care provider.  Stretching and range of motion exercises  These exercises warm up your muscles and joints and improve the movement and flexibility of your elbow. These exercises also help to relieve pain, numbness, and tingling.  Exercise A: Wrist extensor stretch  1. Extend your left / right elbow with your fingers pointing down.  2. Gently pull the palm of your left / right hand toward you until you feel a gentle stretch on the top of your forearm.  3. To increase the stretch, push your left / right hand toward the outer edge or pinkie side of your forearm.  4. Hold this position for __________ seconds.  Repeat __________ times. Complete this exercise __________ times a day.  If directed by your health care provider, repeat this stretch except do it with a bent elbow this time.  Exercise B: Wrist flexor stretch  1. Extend your left / right elbow and turn your palm upward.  2. Gently pull your left / right palm and fingertips back so your wrist extends and your fingers point more toward the ground.  3. You should feel a gentle stretch on the inside of your forearm.  4. Hold this position for __________ seconds.  Repeat __________ times. Complete this exercise __________ times a day.  If directed by your health care provider, repeat this stretch except do it with a bent elbow this time.  Strengthening exercises  These exercises build strength and endurance in your elbow. Endurance is the ability to use your muscles for a long time, even after they get tired.  Exercise C: Wrist extensors  1. Sit with your left / right forearm palm-down and fully supported on a table or countertop. Your elbow should be  resting below the height of your shoulder.  2. Let your left / right wrist extend over the edge of the surface.  3. Loosely hold a __________ weight or a piece of rubber exercise band or tubing in your left / right hand. Slowly curl your left / right hand up toward your forearm. If you are using band or tubing, hold the band or tubing in place with your other hand to provide resistance.  4. Hold this position for __________ seconds.  5. Slowly return to the starting position.  Repeat __________ times. Complete this exercise __________ times a day.  Exercise D: Radial deviators  1. Stand with a __________ weight in your left / right hand. Or, sit while holding a rubber exercise band or tubing with your other arm supported on a table or countertop. Position your hand so your thumb is on top.  2. Raise your hand upward in front of you so your thumb travels toward your forearm, or pull up on the rubber tubing.  3. Hold this position for __________ seconds.  4. Slowly return to the starting position.  Repeat __________ times. Complete this exercise __________ times a day.  Exercise E: Eccentric wrist extensors  1. Sit with your left / right forearm palm-down and fully supported on a table or countertop. Your elbow should be resting below the height of your shoulder.  2. If told by your health care provider, hold a __________ weight in your hand.  3. Let your left / right wrist extend over the edge of the surface.  4. Use your other hand to lift up your left / right hand toward your forearm. Keep your forearm on the table.  5. Using only the muscles in your left / right hand, slowly lower your hand back down to the starting position.  Repeat __________ times. Complete this exercise __________ times a day.  This information is not intended to replace advice given to you by your health care provider. Make sure you discuss any questions you have with your health care provider.  Document Released: 12/18/2006 Document Revised:  08/23/2017 Document Reviewed: 09/15/2016  [employer] Interactive Patient Education © 2017 [employer]

## 2019-11-14 NOTE — PROGRESS NOTES
Subjective:   Chary Vazquez is a 49 y.o. female who presents for Elbow Pain (Right elbow, painfull pulls, tender to the touch x 2 weeks)        Arm Pain    Incident onset: 2 weeks  There was no injury mechanism. Pain location: Right lateral elbow. The quality of the pain is described as burning and aching. The pain does not radiate. The pain has been worsening since the incident. Pertinent negatives include no chest pain, muscle weakness, numbness or tingling. The symptoms are aggravated by movement and palpation. She has tried NSAIDs for the symptoms. The treatment provided mild relief.     Pt noticed worsening lateral elbow pain after folding laundry 2 weeks ago. The pain has worsened. She is now experiencing pain with movement and palpation. No known injury. No previous injury.     Review of Systems   Constitutional: Negative for chills, fever and malaise/fatigue.   Respiratory: Negative for cough and shortness of breath.    Cardiovascular: Negative for chest pain.   Gastrointestinal: Negative for abdominal pain, constipation, diarrhea, nausea and vomiting.   Musculoskeletal: Positive for joint pain (right lateral elbow).   Neurological: Negative for tingling and numbness.   All other systems reviewed and are negative.      PMH:  has a past medical history of Asthma.  MEDS:   Current Outpatient Medications:   •  naproxen (NAPROSYN) 500 MG Tab, Take 1 Tab by mouth 2 times a day, with meals for 7 days., Disp: 14 Tab, Rfl: 0  •  Albuterol Sulfate (PROAIR RESPICLICK) 108 (90 Base) MCG/ACT AEROSOL POWDER, BREATH ACTIVATED, Inhale 1 Puff by mouth every four hours as needed., Disp: 1 Each, Rfl: 1  •  Albuterol Sulfate (PROAIR RESPICLICK) 108 (90 Base) MCG/ACT AEROSOL POWDER, BREATH ACTIVATED, Inhale 2 Puffs by mouth every 4 hours., Disp: 1 Each, Rfl: 3  •  predniSONE (DELTASONE) 20 MG Tab, Take 3 tabs at once PO daily x 5 days, then take 2 tabs at once daily x 3 days, then take 1 tab PO daily x 2 days (Patient not  "taking: Reported on 11/13/2019), Disp: 23 Tab, Rfl: 0  •  promethazine-dextromethorphan (PROMETHAZINE-DM) 6.25-15 MG/5ML syrup, Take 5 mL by mouth every four hours as needed. (Patient not taking: Reported on 11/13/2019), Disp: 120 mL, Rfl: 0  ALLERGIES:   Allergies   Allergen Reactions   • Pcn [Penicillins]      Rash/throat swelling     SURGHX: History reviewed. No pertinent surgical history.  SOCHX:  reports that she has never smoked. She has never used smokeless tobacco. She reports previous alcohol use. She reports that she does not use drugs.  History reviewed. No pertinent family history.     Objective:   /72 (BP Location: Left arm, Patient Position: Sitting, BP Cuff Size: Adult long)   Pulse (!) 106   Temp 37.2 °C (98.9 °F) (Temporal)   Resp 20   Ht 1.803 m (5' 11\")   Wt (!) 126.9 kg (279 lb 12.8 oz)   SpO2 96%   BMI 39.02 kg/m²     Physical Exam  Vitals signs reviewed.   Constitutional:       General: She is not in acute distress.     Appearance: She is well-developed.   HENT:      Head: Normocephalic and atraumatic.      Right Ear: External ear normal.      Left Ear: External ear normal.      Nose: Nose normal.   Eyes:      Conjunctiva/sclera: Conjunctivae normal.      Pupils: Pupils are equal, round, and reactive to light.   Neck:      Musculoskeletal: Normal range of motion and neck supple.      Trachea: No tracheal deviation.   Cardiovascular:      Rate and Rhythm: Normal rate and regular rhythm.   Pulmonary:      Effort: Pulmonary effort is normal.      Breath sounds: Normal breath sounds.   Musculoskeletal:      Right elbow: She exhibits decreased range of motion. She exhibits no swelling and no effusion. Tenderness found. Lateral epicondyle tenderness noted. No radial head and no medial epicondyle tenderness noted.        Arms:    Skin:     General: Skin is warm and dry.      Capillary Refill: Capillary refill takes less than 2 seconds.   Neurological:      Mental Status: She is alert and " oriented to person, place, and time.   Psychiatric:         Behavior: Behavior normal.           Assessment/Plan:     1. Lateral epicondylitis of right elbow  naproxen (NAPROSYN) 500 MG Tab   2. Mild intermittent asthma, unspecified whether complicated  Albuterol Sulfate (PROAIR RESPICLICK) 108 (90 Base) MCG/ACT AEROSOL POWDER, BREATH ACTIVATED     Supportive care reviewed. Consider arm brace. Tennis elbow and tennis elbow rehab handout provided. A refill for albuterol was provided.     Follow-up with primary care provider.  If symptoms worsen or persist patient can return to clinic for reevaluation. All side effects of medication discussed including allergic response, GI upset, tendon injury, etc. Patient and  confirmed understanding of information.    Please note that this dictation was created using voice recognition software. I have made every reasonable attempt to correct obvious errors, but I expect that there are errors of grammar and possibly content that I did not discover before finalizing the note.

## 2020-03-11 ENCOUNTER — OFFICE VISIT (OUTPATIENT)
Dept: MEDICAL GROUP | Facility: IMAGING CENTER | Age: 50
End: 2020-03-11
Payer: COMMERCIAL

## 2020-03-11 VITALS
HEART RATE: 81 BPM | RESPIRATION RATE: 16 BRPM | SYSTOLIC BLOOD PRESSURE: 120 MMHG | WEIGHT: 279 LBS | TEMPERATURE: 98.9 F | OXYGEN SATURATION: 96 % | DIASTOLIC BLOOD PRESSURE: 74 MMHG | BODY MASS INDEX: 39.06 KG/M2 | HEIGHT: 71 IN

## 2020-03-11 DIAGNOSIS — Z13.1 SCREENING FOR DIABETES MELLITUS (DM): ICD-10-CM

## 2020-03-11 DIAGNOSIS — Z76.89 ENCOUNTER TO ESTABLISH CARE WITH NEW DOCTOR: ICD-10-CM

## 2020-03-11 DIAGNOSIS — Z23 NEED FOR VACCINATION: ICD-10-CM

## 2020-03-11 DIAGNOSIS — T78.40XD ALLERGIC STATE, SUBSEQUENT ENCOUNTER: ICD-10-CM

## 2020-03-11 DIAGNOSIS — Z13.220 SCREENING FOR HYPERLIPIDEMIA: ICD-10-CM

## 2020-03-11 DIAGNOSIS — M17.11 OSTEOARTHRITIS OF RIGHT KNEE, UNSPECIFIED OSTEOARTHRITIS TYPE: ICD-10-CM

## 2020-03-11 DIAGNOSIS — J45.20 MILD INTERMITTENT ASTHMA WITHOUT COMPLICATION: ICD-10-CM

## 2020-03-11 DIAGNOSIS — Z12.11 COLON CANCER SCREENING: ICD-10-CM

## 2020-03-11 DIAGNOSIS — Z12.39 BREAST SCREENING: ICD-10-CM

## 2020-03-11 DIAGNOSIS — Z13.0 SCREENING FOR DEFICIENCY ANEMIA: ICD-10-CM

## 2020-03-11 DIAGNOSIS — M19.072 OSTEOARTHRITIS OF LEFT ANKLE, UNSPECIFIED OSTEOARTHRITIS TYPE: ICD-10-CM

## 2020-03-11 DIAGNOSIS — Z82.69 FAMILY HISTORY OF SYSTEMIC LUPUS ERYTHEMATOSUS (SLE) IN MOTHER: ICD-10-CM

## 2020-03-11 PROBLEM — M17.9 OSTEOARTHRITIS OF KNEE: Status: ACTIVE | Noted: 2019-04-15

## 2020-03-11 PROCEDURE — 90471 IMMUNIZATION ADMIN: CPT | Performed by: NURSE PRACTITIONER

## 2020-03-11 PROCEDURE — 99214 OFFICE O/P EST MOD 30 MIN: CPT | Mod: 25 | Performed by: NURSE PRACTITIONER

## 2020-03-11 PROCEDURE — 90732 PPSV23 VACC 2 YRS+ SUBQ/IM: CPT | Performed by: NURSE PRACTITIONER

## 2020-03-11 ASSESSMENT — PATIENT HEALTH QUESTIONNAIRE - PHQ9
CLINICAL INTERPRETATION OF PHQ2 SCORE: 2
SUM OF ALL RESPONSES TO PHQ QUESTIONS 1-9: 2
5. POOR APPETITE OR OVEREATING: 0 - NOT AT ALL

## 2020-03-11 NOTE — PATIENT INSTRUCTIONS
1st Glucosamine chondroitin    Antiflamatory   Turmeric with black pepper  Stinging Nettle 1300 mg capsule or tablet    Discussed increasing fiber to a daily total of 20-30 grams as tolerated.  Keep a 7-day log of average fiber intake before increasing fiber. Seek fiber from your daily food intake, discussed different foods that are higher in fiber. Discussed with patient as she increases her fiber she can supplement fiber intake with OTC psyllium husk as tolerated.    Arnica gel

## 2020-03-13 ENCOUNTER — HOSPITAL ENCOUNTER (OUTPATIENT)
Dept: LAB | Facility: MEDICAL CENTER | Age: 50
End: 2020-03-13
Attending: NURSE PRACTITIONER
Payer: COMMERCIAL

## 2020-03-13 DIAGNOSIS — Z13.220 SCREENING FOR HYPERLIPIDEMIA: ICD-10-CM

## 2020-03-13 DIAGNOSIS — Z13.0 SCREENING FOR DEFICIENCY ANEMIA: ICD-10-CM

## 2020-03-13 DIAGNOSIS — Z13.1 SCREENING FOR DIABETES MELLITUS (DM): ICD-10-CM

## 2020-03-13 DIAGNOSIS — Z82.69 FAMILY HISTORY OF SYSTEMIC LUPUS ERYTHEMATOSUS (SLE) IN MOTHER: ICD-10-CM

## 2020-03-13 PROBLEM — M19.072 OSTEOARTHRITIS OF LEFT ANKLE: Status: ACTIVE | Noted: 2020-03-13

## 2020-03-13 LAB
ALBUMIN SERPL BCP-MCNC: 4 G/DL (ref 3.2–4.9)
ALBUMIN/GLOB SERPL: 1.5 G/DL
ALP SERPL-CCNC: 59 U/L (ref 30–99)
ALT SERPL-CCNC: 21 U/L (ref 2–50)
ANION GAP SERPL CALC-SCNC: 6 MMOL/L (ref 7–16)
AST SERPL-CCNC: 16 U/L (ref 12–45)
BASOPHILS # BLD AUTO: 1 % (ref 0–1.8)
BASOPHILS # BLD: 0.06 K/UL (ref 0–0.12)
BILIRUB SERPL-MCNC: 0.5 MG/DL (ref 0.1–1.5)
BUN SERPL-MCNC: 10 MG/DL (ref 8–22)
CALCIUM SERPL-MCNC: 9.2 MG/DL (ref 8.5–10.5)
CHLORIDE SERPL-SCNC: 103 MMOL/L (ref 96–112)
CHOLEST SERPL-MCNC: 149 MG/DL (ref 100–199)
CO2 SERPL-SCNC: 26 MMOL/L (ref 20–33)
CREAT SERPL-MCNC: 0.62 MG/DL (ref 0.5–1.4)
EOSINOPHIL # BLD AUTO: 0.12 K/UL (ref 0–0.51)
EOSINOPHIL NFR BLD: 1.9 % (ref 0–6.9)
ERYTHROCYTE [DISTWIDTH] IN BLOOD BY AUTOMATED COUNT: 49.1 FL (ref 35.9–50)
EST. AVERAGE GLUCOSE BLD GHB EST-MCNC: 163 MG/DL
GLOBULIN SER CALC-MCNC: 2.6 G/DL (ref 1.9–3.5)
GLUCOSE SERPL-MCNC: 181 MG/DL (ref 65–99)
HBA1C MFR BLD: 7.3 % (ref 0–5.6)
HCT VFR BLD AUTO: 37.5 % (ref 37–47)
HDLC SERPL-MCNC: 27 MG/DL
HGB BLD-MCNC: 11.9 G/DL (ref 12–16)
IMM GRANULOCYTES # BLD AUTO: 0.06 K/UL (ref 0–0.11)
IMM GRANULOCYTES NFR BLD AUTO: 1 % (ref 0–0.9)
LDLC SERPL CALC-MCNC: ABNORMAL MG/DL
LYMPHOCYTES # BLD AUTO: 1.7 K/UL (ref 1–4.8)
LYMPHOCYTES NFR BLD: 27.5 % (ref 22–41)
MCH RBC QN AUTO: 28.2 PG (ref 27–33)
MCHC RBC AUTO-ENTMCNC: 31.7 G/DL (ref 33.6–35)
MCV RBC AUTO: 88.9 FL (ref 81.4–97.8)
MONOCYTES # BLD AUTO: 0.36 K/UL (ref 0–0.85)
MONOCYTES NFR BLD AUTO: 5.8 % (ref 0–13.4)
NEUTROPHILS # BLD AUTO: 3.89 K/UL (ref 2–7.15)
NEUTROPHILS NFR BLD: 62.8 % (ref 44–72)
NRBC # BLD AUTO: 0 K/UL
NRBC BLD-RTO: 0 /100 WBC
PLATELET # BLD AUTO: 291 K/UL (ref 164–446)
PMV BLD AUTO: 10.9 FL (ref 9–12.9)
POTASSIUM SERPL-SCNC: 4.3 MMOL/L (ref 3.6–5.5)
PROT SERPL-MCNC: 6.6 G/DL (ref 6–8.2)
RBC # BLD AUTO: 4.22 M/UL (ref 4.2–5.4)
RHEUMATOID FACT SER IA-ACNC: <10 IU/ML (ref 0–14)
SODIUM SERPL-SCNC: 135 MMOL/L (ref 135–145)
TRIGL SERPL-MCNC: 402 MG/DL (ref 0–149)
WBC # BLD AUTO: 6.2 K/UL (ref 4.8–10.8)

## 2020-03-13 PROCEDURE — 86038 ANTINUCLEAR ANTIBODIES: CPT

## 2020-03-13 PROCEDURE — 85025 COMPLETE CBC W/AUTO DIFF WBC: CPT

## 2020-03-13 PROCEDURE — 80061 LIPID PANEL: CPT

## 2020-03-13 PROCEDURE — 83036 HEMOGLOBIN GLYCOSYLATED A1C: CPT

## 2020-03-13 PROCEDURE — 86431 RHEUMATOID FACTOR QUANT: CPT

## 2020-03-13 PROCEDURE — 80053 COMPREHEN METABOLIC PANEL: CPT

## 2020-03-13 PROCEDURE — 36415 COLL VENOUS BLD VENIPUNCTURE: CPT

## 2020-03-14 LAB — NUCLEAR IGG SER QL IA: NORMAL

## 2020-03-14 NOTE — PROGRESS NOTES
Subjective:   CC: Establish Care    HISTORY OF THE PRESENT ILLNESS: Patient is a 50 y.o. female. Denies regular prior PCP.  Patient has history of asthma, seasonal allergies, cholecystomy, and osteoarthritis of right knee and left ankle. Patient is here today to establish care and discuss ongoing right knee pain and left ankle. Patient would also like to discuss overall health and age appropriate health screenings.    States she will be moving to [place] with her  in about a month for her new job.    Asthma  States this is an ongoing stable condition. States that she intermittently has SOB that is worsened with seasonal allergies. States she uses her albuterol inhaler about 2-3 month. States during the summer and spring months she finds she will use it daily at times, but regularly about 2-3 times a week. Denies current symptoms.     Allergy  States she has seasonal allergies that are worse during the spring and summer. Staets she has a runny nose, itchy eyes and intermittent SOB. States that she takes Zyrtec 10 mg daily during this time. States throughout the year she will take it intermittently. States she feels her allergies are controlled with Zyrtec and intermittent use of albuterol inhaler as needed. States she has no concerns at this time.     Osteoarthritis of knee  States she has had 6 knee surgeries. States her las surgery was in 2005. States she has been told she needs a knee replacement, but is too young. States that she has daily pain that interferes with her daily activity and ability to be consistently physically active. States she use to be very active and able to ride a bike and play with her grandchildren. States she is unable to do these activities anymore due to the ongoing pain. States that her knee pain feels like it is grinding. States that she takes Aleve or Ibuprofen prn for moderate to severe pain.      Osteoarthritis of left ankle  States she broke her left ankle by stepping in a  hole. States that fracture required surgery and since it has caused her intermittent pain. States that she has intermittently swelling noted in left ankle.     Allergies: Other food and Pcn [penicillins]    Current Outpatient Medications Ordered in Epic   Medication Sig Dispense Refill   • IBUPROFEN PO Take  by mouth.     • Naproxen Sodium (ALEVE PO) Take  by mouth.     • Albuterol Sulfate (PROAIR RESPICLICK) 108 (90 Base) MCG/ACT AEROSOL POWDER, BREATH ACTIVATED Inhale 2 Puffs by mouth every 4 hours. 1 Each 3     No current Epic-ordered facility-administered medications on file.      Past Medical History:   Diagnosis Date   • Allergy    • Arthritis    • Asthma      Past Surgical History:   Procedure Laterality Date   • CHOLECYSTECTOMY     • OTHER      right knee surgery   • OTHER ORTHOPEDIC SURGERY Right     knee, 6 surgerys   • TONSILLECTOMY     • TUBAL COAGULATION LAPAROSCOPIC BILATERAL     • TUBAL LIGATION       Social History     Tobacco Use   • Smoking status: Never Smoker   • Smokeless tobacco: Never Used   Substance Use Topics   • Alcohol use: Not Currently   • Drug use: No     Social History     Social History Narrative   • Not on file     Family History   Problem Relation Age of Onset   • Lupus Mother    • Heart Disease Mother         valve replacement    • Lupus Maternal Grandfather    • Heart Disease Maternal Grandfather    • Dementia Maternal Grandfather    • Alcohol abuse Brother        Health Maintenance: Completed.    ROS:   Constitutional: Denies fever, chills, night sweats, weight loss, or malaise/fatigue. Gradual weight gain.  HENT: Denies nasal congestion, sore throat, hearing loss, enlarged thyroid, or difficulty swallowing.    Eyes: Denies changes in vision, pain.   Respiratory: Denies cough. Intermittent SOB at rest or activity, hx of asthma, see HPI.    Cardiovascular: Denies tachycardia, chest pain, palpitations, or  leg swelling.   Gastrointestinal: Denies N/V/C/D, abdominal pain, loss  "appetite, reflux, or hematochezia.  Genitourinary: Denies difficulty voiding, dysuria, nocturia, or hematuria.   Skin: Negative for rash or worrisome moles.   Neurological: Negative for dizziness, focal weakness and headaches.   Endo/Heme/Allergies: Denies bruise/bleed easily. Seasonal allergies, see HPI.   Psychiatric/Behavioral: Denies depression, nervous/anxious, difficulty sleeping.     Objective:   Exam: /74 (BP Location: Left arm, Patient Position: Sitting, BP Cuff Size: Large adult)   Pulse 81   Temp 37.2 °C (98.9 °F) (Temporal)   Resp 16   Ht 1.803 m (5' 11\")   Wt (!) 126.6 kg (279 lb)   SpO2 96%  Body mass index is 38.91 kg/m².    General: Normal appearing. No distress.  HEENT: Normocephalic. Eyes conjunctiva clear lids without ptosis, PERRLA, ears normal shape and contour, canals are clear bilaterally, tympanic membranes pearly gray, intact, mild bulging, no drainage noted, no turbinate erythema, no polyps visible, oropharynx is with mild erythema, PND. No edema or exudates. Teeth intact.  Neck: Supple without JVD or abnormal masses. Small soft mobile thyroid palpated, no nodules palpated, non-tender.  Pulmonary: Clear to ausculation.  Normal effort. No rales, ronchi, or wheezing.  Cardiovascular: Regular rate and rhythm without murmur. Pedal and radial pulses are intact and equal bilaterally.  Abdomen: Soft, nontender, nondistended. Normal bowel sounds. Liver and spleen are not palpable.  Lymph: No cervical or supraclavicular lymph nodes are palpable.  Skin: Warm and dry. No obvious lesions.  Musculoskeletal: Normal gait. No extremity cyanosis, clubbing, or edema.  Psych: Normal mood and affect. Alert and oriented x3. Judgment and insight is normal.    Assessment & Plan:   1. Encounter to establish care with new doctor  2. Screening for deficiency anemia  3. Screening for hyperlipidemia  4. Screening for diabetes mellitus (DM)  5. Colon cancer screening  6. Need for vaccination  7. Breast " screening  8. BMI 38.0-38.9,adult  Reviewed with patient medication use and side effects. Medical, past, surgical history reviewed with patient. Discussed with patient the risk and benefits of receiving vaccines. Discussed CDC recommendations for immunizations and USPSTF guidelines for screening exams.  Verbalized understanding. Encouraged to seek out Shingrix vaccine once she is relocated to [place] at her local pharmacy, verbalized understanding and willingness. Encouraged patient to wash hands regularly and avoid sick contacts while supporting immune system with Vitamin C, Zinc, Elderberry, and garlic. Discussed preventive screening labs with patient, verbalized understanding. Encouraged a goal of an accumulation of 150 minutes or more of moderate-intensity activity each week as tolerated. Discussed a healthy diet that is low in fat, sodium, and cholesterol, such as the mediterranean diet that is typically high in fruits, vegetables, whole grains, beans, nuts, and seeds, includes olive oil as an important source of monounsaturated fat, DASH diet and/or also consider a plant-based diet. Discussed increasing fiber to a daily total of 20-30 grams as tolerated for overall health benefit.  Instructed to keep a 7-day log of average fiber intake before increasing fiber. Instructed to seek fiber from her daily food intake, discussed different foods that are higher in fiber. Discussed with patient as she increases her fiber she can supplement fiber intake with OTC psyllium husk as tolerated. Instructed to increase fiber by 2 grams every 5 days as tolerated. Discussed possible GI upset as she increases her fiber, verbalized understanding.    - CBC WITH DIFFERENTIAL; Future  - Lipid Profile; Future  - Comp Metabolic Panel; Future  - HEMOGLOBIN A1C; Future  - REFERRAL TO GI FOR COLONOSCOPY  - PneumoVax PPV23 =>1yo  - [id number]; Future    8. Family history of systemic lupus erythematosus (SLE) in mother  9.  Osteoarthritis of right knee, unspecified osteoarthritis type  10. Osteoarthritis of left ankle, unspecified osteoarthritis type  This is chronic stable condition. Discussed screening for SLE due to family history and ongoing symptoms. Discussed continuing to take ibuprofen 200-600 mg TID as needed for pain with food. Instructed to not exceed more than 3 days in a row to decrease risk of GI bleed or GI upset. Discussed use ice therapy 4 times a day for 20 minutes each time. Instructed to use Biofreeze and/or Arnica gel topical as tolerated to areas of pain for external use only. Discussed starting a trial of OTC Glucosamine Chondroitin, explained the effects and how it works to improve joint comfort, verbalized understanding. Also discussed a trial of over the counter supplement of Turmeric with black pepper and/or Stinging Nettle 1300 mg daily. Discussed possible side effects of supplements verbalized understanding. Discussed when establishing care with a new provider in [place] requesting a referral for orthopedic consult for a knee replacement and/or PT, verbalized understanding.     - AMPARO REFLEXIVE PROFILE; Future  - RHEUMATOID ARTHRITIS FACTOR; Future    11. Allergic state, subsequent encounter  This is a chronic stable condition. Discussed showering or washing hands and face before bed, sleeping with windows closed, using Neti pot system, and changing pillow case regularly. Discussed starting daily Zyrtec 10 mg now to prevent allergy symptoms from occurring, verbalized understanding. Discussed the benefit of a trial of Singulair to continue allergies and asthma symptoms associated with allergies, verbalized understanding and declined at this time.    12. Mild intermittent asthma without complication  This is a chronic stable condition.  Discussed with patient continuing to use albuterol inhaler intermittent as needed up to 2 puffs every 6 hours for SOB or cough. Dicussed requesting a refill as needed, and to  maintain an inhaler in reach, verbalized understanding. Discussed notifying provider if she is using inhaler more than 2-3 times a week due to this classifying not well controlled asthma, discussed asthma stepwise approach to controlling asthma, verbalized understanding.  Discussed seeking emergency services if she experiences worse symptoms.    Return in about 1 week (around 3/18/2020).    Please note that this dictation was created using voice recognition software. I have made every reasonable attempt to correct obvious errors, but I expect that there are errors of grammar and possibly content that I did not discover before finalizing the note.

## 2020-03-14 NOTE — ASSESSMENT & PLAN NOTE
States she broke her left ankle by stepping in a hole. States that fracture required surgery and since it has caused her intermittent pain. States that she has intermittently swelling noted in left ankle.

## 2020-03-14 NOTE — ASSESSMENT & PLAN NOTE
States she has had 6 knee surgeries. States her las surgery was in 2005. States she has been told she needs a knee replacement, but is too young. States that she has daily pain that interferes with her daily activity and ability to be consistently physically active. States she use to be very active and able to ride a bike and play with her grandchildren. States she is unable to do these activities anymore due to the ongoing pain. States that her knee pain feels like it is grinding. States that she takes Aleve or Ibuprofen prn for moderate to severe pain.

## 2020-03-14 NOTE — ASSESSMENT & PLAN NOTE
States this is an ongoing stable condition. States that she intermittently has SOB that is worsened with seasonal allergies. States she uses her albuterol inhaler about 2-3 month. States during the summer and spring months she finds she will use it daily at times, but regularly about 2-3 times a week. Denies current symptoms.

## 2020-03-14 NOTE — ASSESSMENT & PLAN NOTE
States she has seasonal allergies that are worse during the spring and summer. Staets she has a runny nose, itchy eyes and intermittent SOB. States that she takes Zyrtec 10 mg daily during this time. States throughout the year she will take it intermittently. States she feels her allergies are controlled with Zyrtec and intermittent use of albuterol inhaler as needed. States she has no concerns at this time.

## 2020-03-18 ENCOUNTER — HOSPITAL ENCOUNTER (OUTPATIENT)
Facility: MEDICAL CENTER | Age: 50
End: 2020-03-18
Attending: NURSE PRACTITIONER
Payer: COMMERCIAL

## 2020-03-18 ENCOUNTER — OFFICE VISIT (OUTPATIENT)
Dept: MEDICAL GROUP | Facility: IMAGING CENTER | Age: 50
End: 2020-03-18
Payer: COMMERCIAL

## 2020-03-18 VITALS
TEMPERATURE: 98.8 F | OXYGEN SATURATION: 98 % | HEART RATE: 78 BPM | WEIGHT: 286 LBS | BODY MASS INDEX: 40.04 KG/M2 | SYSTOLIC BLOOD PRESSURE: 122 MMHG | RESPIRATION RATE: 13 BRPM | HEIGHT: 71 IN | DIASTOLIC BLOOD PRESSURE: 72 MMHG

## 2020-03-18 DIAGNOSIS — E11.9 TYPE 2 DIABETES MELLITUS WITHOUT COMPLICATION, WITHOUT LONG-TERM CURRENT USE OF INSULIN (HCC): ICD-10-CM

## 2020-03-18 DIAGNOSIS — E78.6 LOW HDL (UNDER 40): ICD-10-CM

## 2020-03-18 DIAGNOSIS — Z12.4 ENCOUNTER FOR PAPANICOLAOU SMEAR OF CERVIX: ICD-10-CM

## 2020-03-18 DIAGNOSIS — E78.1 HYPERTRIGLYCERIDEMIA: ICD-10-CM

## 2020-03-18 DIAGNOSIS — Z01.419 WELL WOMAN EXAM: Primary | ICD-10-CM

## 2020-03-18 PROCEDURE — 99396 PREV VISIT EST AGE 40-64: CPT | Performed by: NURSE PRACTITIONER

## 2020-03-18 PROCEDURE — 87624 HPV HI-RISK TYP POOLED RSLT: CPT

## 2020-03-18 PROCEDURE — 88175 CYTOPATH C/V AUTO FLUID REDO: CPT

## 2020-03-18 ASSESSMENT — FIBROSIS 4 INDEX: FIB4 SCORE: 0.62

## 2020-03-18 ASSESSMENT — PAIN SCALES - GENERAL: PAINLEVEL: NO PAIN

## 2020-03-18 NOTE — PATIENT INSTRUCTIONS
Encouraged a goal of an accumulation of 150 minutes or more of moderate-intensity activity each week as tolerated. Attempting chair exercises    Discussed a healthy diet that is low in fat, sodium, and cholesterol, such as the mediterranean diet that is typically high in fruits, vegetables, whole grains, beans, nuts, and seeds, includes olive oil as an important source of monounsaturated fat, DASH diet and/or also consider a plant-based diet.     Increase fiber to a daily total of 20-30 grams as tolerated for overall health benefit. Increases her fiber she can supplement fiber intake with OTC psyllium husk as tolerated. Instructed to increase fiber by 2 grams every 5 days as tolerated. Discussed possible GI upset as she increases her fiber, verbalized understanding.    ADA website less than 150 carbs in 24 hours.     Over the counter Vegan Omega 3 oil

## 2020-03-18 NOTE — PROGRESS NOTES
Subjective:   CC:   Chief Complaint   Patient presents with   • Gynecologic Exam   • Lab Results     HPI:   Chary Vazquez is a 50 y.o. female who presents for annual exam. She is feeling well.States ongoing chronic right knee pain and left ankle pain. Here today to discuss lab results and annual exam with pap smear.    Type 2 DM without complication, without long-term current use of insulin (HCC)  States no know history of T2DM or prediabetes. States that she is unable to exercise regularly due to ongoing right knee pain and left ankle pain. States that she regularly does not eat breakfast or lunch due to work schedule and not feeling hunger. States that she eats a large dinner. States overall she feels that she eats healthy, but could eat less at dinner.   3/13/2020: A1c: 7.3.  GFR: >60  Lab Results   Component Value Date/Time    SODIUM 135 2020 07:07 AM    POTASSIUM 4.3 2020 07:07 AM    CHLORIDE 103 2020 07:07 AM    CO2 26 2020 07:07 AM    GLUCOSE 181 (H) 2020 07:07 AM    BUN 10 2020 07:07 AM    CREATININE 0.62 2020 07:07 AM      Hypertriglyceridemia  Low HDL  Denies know history of elevated triglycerides or low HDL. Denies epigastric pain at this time.   Lab Results   Component Value Date/Time    CHOLSTRLTOT 149 2020 07:07 AM    LDL see below 2020 07:07 AM    HDL 27 (A) 2020 07:07 AM    TRIGLYCERIDE 402 (H) 2020 07:07 AM      Ob-Gyn/ History:    Patient has GYN provider: No  /Para: 5/3  Last Pap Smear: Unable to report date of last pap smear.  No history of abnormal pap smears.  Gyn Surgery:  Tubal ligation.  Current Contraceptive Method: Tubal ligation. Yes currently sexually active.  Last menstrual period: 3/2020.  Periods regular. Heavy bleeding for about 2-3 days. Cramping is none.   She does not take OTC analgesics for cramps.  No significant bloating/fluid retention, pelvic pain, or dyspareunia. No vaginal discharge  Urinary  incontinence: Denies regular incontinence. States that she intermittently has incontinence if she sneezes or coughs too hard.     Health Maintenance  Advanced directive:  No. Discussed with patient what an Advance Directive is and what it consist of, as well as, the benefit of beginning  to explore this subject, verbalized understanding.  Cholesterol Screening: See HPI.  Diabetes Screening: See HPI.  Diet: See HPI.  Exercise:See HPI.  Substance Abuse: No.  Safe in relationship.  Seat belts, bike helmet, gun safety discussed. No guns in the home.  Sun protection used.    Cancer screening  Colorectal Cancer Screening: Pending referral for colonoscopy. Patient is moving to [place] in two weeks and is planning on completing colonoscopy once she is in [place]. States she was unable to schedule before her move.  Cervical Cancer Screening: Done today  Breast Cancer Screening: Clinical breast exam done today, mammogram will be completed once patient moves to [place], unable to schedule before move.    Infectious disease screening/Immunizations  --STI Screening: Declined  --Practices safe sex.  --HIV Screening:  --Immunizations:    Influenza: 10/2019   Tetanus:1/2013   Shingles: Educated about Shingles vaccine on 3/11/2020. Will obtain once she is living in [place].   Pneumococcal : 3/11/2020     She  has a past medical history of Allergy, Arthritis, and Asthma.  She  has a past surgical history that includes other orthopedic surgery (Right); tubal ligation; tonsillectomy; cholecystectomy; tubal coagulation laparoscopic bilateral; and other.    Family History   Problem Relation Age of Onset   • Lupus Mother    • Heart Disease Mother         valve replacement    • Lupus Maternal Grandfather    • Heart Disease Maternal Grandfather    • Dementia Maternal Grandfather    • Alcohol abuse Brother      Social History     Socioeconomic History   • Marital status:      Spouse name: Not on file   • Number of children: Not on  file   • Years of education: Not on file   • Highest education level: Not on file   Occupational History   • Not on file   Social Needs   • Financial resource strain: Not on file   • Food insecurity     Worry: Not on file     Inability: Not on file   • Transportation needs     Medical: Not on file     Non-medical: Not on file   Tobacco Use   • Smoking status: Never Smoker   • Smokeless tobacco: Never Used   Substance and Sexual Activity   • Alcohol use: Not Currently   • Drug use: No   • Sexual activity: Yes     Partners: Male   Lifestyle   • Physical activity     Days per week: Not on file     Minutes per session: Not on file   • Stress: Not on file   Relationships   • Social connections     Talks on phone: Not on file     Gets together: Not on file     Attends Orthodoxy service: Not on file     Active member of club or organization: Not on file     Attends meetings of clubs or organizations: Not on file     Relationship status: Not on file   • Intimate partner violence     Fear of current or ex partner: Not on file     Emotionally abused: Not on file     Physically abused: Not on file     Forced sexual activity: Not on file   Other Topics Concern   • Not on file   Social History Narrative   • Not on file     Patient Active Problem List    Diagnosis Date Noted   • Type 2 diabetes mellitus without complication, without long-term current use of insulin ([facility]) 03/20/2020   • Hypertriglyceridemia 03/20/2020   • Low HDL (under 40) 03/20/2020   • Osteoarthritis of left ankle 03/13/2020   • Allergy    • Asthma    • Osteoarthritis of knee 04/15/2019     Current Outpatient Medications   Medication Sig Dispense Refill   • metFORMIN (GLUCOPHAGE) 500 MG Tab Take 1 Tab by mouth 2 times a day, with meals. 180 Tab 0   • IBUPROFEN PO Take  by mouth.     • Naproxen Sodium (ALEVE PO) Take  by mouth.     • Albuterol Sulfate (PROAIR RESPICLICK) 108 (90 Base) MCG/ACT AEROSOL POWDER, BREATH ACTIVATED Inhale 2 Puffs by mouth every 4  "hours. 1 Each 3     No current facility-administered medications for this visit.      Allergies   Allergen Reactions   • Other Food      Seafood, tomatoes    • Pcn [Penicillins]      Rash/throat swelling     Review of Systems:  Constitutional: Denies fever, chills, night sweats, weight loss, or malaise/fatigue. Gradual weight gain.  HENT: Denies nasal congestion, sore throat, hearing loss, enlarged thyroid, or difficulty swallowing.    Eyes: Denies changes in vision, pain.   Respiratory: Denies cough. Intermittent SOB at rest or activity, hx of asthma.    Cardiovascular: Denies tachycardia, chest pain, palpitations, or  leg swelling.   Gastrointestinal: Denies N/V/C/D, abdominal pain, loss appetite, reflux, or hematochezia.  Genitourinary: Denies difficulty voiding, dysuria, nocturia, or hematuria.   Skin: Negative for rash or worrisome moles.   Neurological: Negative for dizziness, focal weakness and headaches.   Endo/Heme/Allergies: Denies bruise/bleed easily. Seasonal allergies.   Psychiatric/Behavioral: Denies depression, nervous/anxious, difficulty sleeping.  MSK: Ongoing osteoarthritis of right knee and left ankle.     Objective:     /72 (BP Location: Left arm, Patient Position: Sitting, BP Cuff Size: Adult)   Pulse 78   Temp 37.1 °C (98.8 °F) (Temporal)   Resp 13   Ht 1.803 m (5' 11\")   Wt (!) 129.7 kg (286 lb)   SpO2 98%   BMI 39.89 kg/m²   Body mass index is 39.89 kg/m².  Wt Readings from Last 4 Encounters:   03/18/20 (!) 129.7 kg (286 lb)   03/11/20 (!) 126.6 kg (279 lb)   11/13/19 (!) 126.9 kg (279 lb 12.8 oz)   09/03/19 124.3 kg (274 lb)     Physical Exam:  General: Normal appearing. No distress.  HEENT: Normocephalic. Eyes conjunctiva clear lids without ptosis, PERRLA, ears normal shape and contour, canals are clear bilaterally, tympanic membranes pearly gray, intact, non-bulging, no drainage noted, Nares patent, no turbinate erythema, no polyps visible. Teeth intact. Oropharynx is without " erythema, edema or exudates.   Neck: Supple without JVD or abnormal masses. Small soft mobile thyroid palpated, no nodules palpated, non-tender.  Pulmonary: Clear to ausculation.  Normal effort. No rales, ronchi, or wheezing.  Cardiovascular: Regular rate and rhythm without murmur. Pedal and radial pulses are intact and equal bilaterally.  Abdomen: Soft, round, nontender, nondistended. Normal bowel sounds. Liver and spleen are not palpable.  Lymph: No cervical or supraclavicular lymph nodes are palpable  Skin: Warm and dry.  No obvious lesions. No rash noted.  Musculoskeletal: Normal gait. No extremity cyanosis, clubbing, or edema. Full AROM of neck and extremities. Right leg muscle strength 4/5, pain noted with AROM in right knee. Muscle strength on left leg and upper body 5/5. Crepitus noted bilateral in knees.   Psych: Normal mood and affect. Judgment and insight is normal.  Breast: Breasts examined supine. No skin changes, peau d'orange or nipple retraction. No discharge. No axillary or supraclavicular adenopathy. No masses or nodularity palpable. Normal fibrous breast tissue noted.  : Perineum and external genitalia normal without rash. Vagina with normal and physiologic, scant, white and odorless discharge. Cervix without visible lesions or discharge. Bimanual exam deferred due to no  complaints and anatomy normal on visual inspection.  Neurological: Alert and oriented x 3. DTRs 2+/3 and symmetric. CN 2-12 grossly intact, Sensation intact.    A chaperone was offered to the patient during today's exam. Chaperone name: Chhaya Young MA was present.    Assessment and Plan:   1. Well woman exam  2. Encounter for Papanicolaou smear of cervix  Reviewed with patient medication use and side effects. Medical, past, surgical history reviewed with patient. Discussed with patient the risk and benefits of receiving vaccines. Discussed CDC recommendations for immunizations and USPSTF guidelines for screening exams.   Verbalized understanding. Patient will seek out Shingrix in her new community, as well as, completing colonoscopy and mammogram screening. Encouraged patient to wash hands regularly and avoid sick contacts while supporting immune system with Vitamin C, Zinc, Elderberry, and garlic. Will report pap smear results to patient once obtained.    -     THINPREP PAP WITH HPV; Future    3. Type 2 diabetes mellitus without complication, without long-term current use of insulin (HCC)  This is a stable condition. Reviewed with patient plan of care to lower A1c to below 7.3%, verbalized understanding. Discussed an accumulation of 150 minutes or more of moderate-intensity activity each week as tolerated. Discussed low impact exercises due to her chronic right knee pain. Discussed the importance of exploring replacing right knee in near future due to overall benefit of increasing physcial activity, verbalized understanding and willingness to explore replacement once settled in new state of [place]. Discussed a healthy diet that is low in fat, sodium, and cholesterol, such as the mediterranean diet that is typically high in fruits, vegetables, whole grains, beans, nuts, and seeds, includes olive oil as an important source of monounsaturated fat, DASH diet, and/or also consider a plant-based diet.  Encouraged to explore diabetic Association website for further nutritional counseling. Discussed with patient continue to the goal of 150 carbohydrates or less in 24-hour time.  Instructed to start Metformin 500 mg in am for 10 days, if tolerated increase to BID with meals. Discussed possible side effects with patient, verbalized understanding. Due to patient moving, encouraged to establish care with new PCP in [place] for further care of diabetes management, verbalized understanding. Discussed managing care for the next 4 weeks duet the transition of moving.  Reviewed with patient signs and symptoms of hypoglycemia or hyperglycemia.   Discussed with patient ways to manage hyper and hypoglycemia symptoms, verbalized understanding.  Discussed on-going need to assess kidney function, assess for comorbidity of hypothyroidism, retinal eye exam, and regular teeth cleaning, verbalized understanding.  Pt. Instructed to walking barefoot, the use of heating pads or hot water bottles, and stepping into a bath without checking the temperature. Instructed toenails should be trimmed to the shape of the toe and filed to remove sharp edges. Inspected feet daily, looking between and underneath the toes and at pressure areas for skin breaks, blisters, swelling, or redness. Use a mirror or, if vision is impaired, have someone else perform the examination.Shoes should fit properly and not be too tight, and the socks should be cotton, loose fitting, and changed every day. Washed feet daily in lukewarm water. Mild soap should be used and the feet should be dried by gentle patting. A moisturizing cream or lotion should then be applied. Discussed due to moving new PCP will place a order for retinal eye exam, verbalized understanding. Due to current COVID-19 and [facility] asking PCPs to triage establish patient office visits, and conduct a telephone visit if possible to decrease possible exposure to COVID-19. This patient meets criteria for conducting a telephone visit on her next appointment.  Will be scheduled for 4 weeks for follow up on initiating medication and lifestyle changes. Expected A1c would be in 3 months, patient verbalized understanding.    -     metFORMIN (GLUCOPHAGE) 500 MG Tab; Take 1 Tab by mouth 2 times a day, with meals.    4. Hypertriglyceridemia  5. Low HDL (under 40)  This is a stable condition. Reviewed recent labs with patient, verbalized understanding. Discussed at this time lifestyle modifications and starting Omega 3 Fish oil is the current recommendations for her elevated triglyceride levels and low HDL to prevent CVD and/or risk of  stroke in the next ten years, and over her lifetime, verbalized understanding.  Encouraged accumulation of 150 minutes or more of moderate-intensity activity each week as tolerated. Discussed a healthy diet that is low in fat, sodium, and cholesterol, such as the mediterranean diet that is typically high in fruits, vegetables, whole grains, beans, nuts, and seeds, includes olive oil as an important source of monounsaturated fat, DASH diet, and/or also consider a plant-based diet. Discussed by treating her T2DM we will essential be treating her elevated triglycerides and low HDL, verbalized understanding. Discussed with patient if her levels are not responsive to this plan of care, statin therapy will be suggested in the future. Discussed with patient the importance of establishing care in her new [facility] [place] [facility] for further management. Expected redraw of lipid panel is 3 months.     Follow-up: Return in about 4 weeks (around 4/15/2020). Will complete telephone encounter due to current COVID-19 and established patient.

## 2020-03-19 LAB
CYTOLOGY REG CYTOL: NORMAL
HPV HR 12 DNA CVX QL NAA+PROBE: NEGATIVE
HPV16 DNA SPEC QL NAA+PROBE: NEGATIVE
HPV18 DNA SPEC QL NAA+PROBE: NEGATIVE
SPECIMEN SOURCE: NORMAL

## 2020-03-20 PROBLEM — E11.9 TYPE 2 DIABETES MELLITUS WITHOUT COMPLICATION, WITHOUT LONG-TERM CURRENT USE OF INSULIN (HCC): Status: ACTIVE | Noted: 2020-03-20

## 2020-03-20 PROBLEM — E78.6 LOW HDL (UNDER 40): Status: ACTIVE | Noted: 2020-03-20

## 2020-03-20 PROBLEM — E78.1 HYPERTRIGLYCERIDEMIA: Status: ACTIVE | Noted: 2020-03-20

## 2020-05-18 ENCOUNTER — PATIENT MESSAGE (OUTPATIENT)
Dept: HEALTH INFORMATION MANAGEMENT | Facility: OTHER | Age: 50
End: 2020-05-18